# Patient Record
Sex: MALE | Race: WHITE
[De-identification: names, ages, dates, MRNs, and addresses within clinical notes are randomized per-mention and may not be internally consistent; named-entity substitution may affect disease eponyms.]

---

## 2019-01-28 ENCOUNTER — HOSPITAL ENCOUNTER (EMERGENCY)
Dept: HOSPITAL 26 - MED | Age: 29
LOS: 1 days | Discharge: HOME | End: 2019-01-29
Payer: MEDICAID

## 2019-01-28 VITALS — BODY MASS INDEX: 30.48 KG/M2 | WEIGHT: 225 LBS | HEIGHT: 72 IN

## 2019-01-28 VITALS — SYSTOLIC BLOOD PRESSURE: 135 MMHG | DIASTOLIC BLOOD PRESSURE: 81 MMHG

## 2019-01-28 DIAGNOSIS — E03.9: Primary | ICD-10-CM

## 2019-01-29 VITALS — DIASTOLIC BLOOD PRESSURE: 81 MMHG | SYSTOLIC BLOOD PRESSURE: 135 MMHG

## 2019-01-29 LAB
ALBUMIN FLD-MCNC: 4.1 G/DL (ref 3.4–5)
ANION GAP SERPL CALCULATED.3IONS-SCNC: 10.3 MMOL/L (ref 8–16)
AST SERPL-CCNC: 20 U/L (ref 15–37)
BASOPHILS # BLD AUTO: 0.1 K/UL (ref 0–0.22)
BASOPHILS NFR BLD AUTO: 0.7 % (ref 0–2)
BILIRUB SERPL-MCNC: 0.6 MG/DL (ref 0–1)
BUN SERPL-MCNC: 18 MG/DL (ref 7–18)
CHLORIDE SERPL-SCNC: 103 MMOL/L (ref 98–107)
CO2 SERPL-SCNC: 31.4 MMOL/L (ref 21–32)
CREAT SERPL-MCNC: 1 MG/DL (ref 0.7–1.3)
EOSINOPHIL # BLD AUTO: 0.5 K/UL (ref 0–0.4)
EOSINOPHIL NFR BLD AUTO: 5.4 % (ref 0–4)
ERYTHROCYTE [DISTWIDTH] IN BLOOD BY AUTOMATED COUNT: 12.8 % (ref 11.6–13.7)
GFR SERPL CREATININE-BSD FRML MDRD: 116 ML/MIN (ref 90–?)
GLUCOSE SERPL-MCNC: 105 MG/DL (ref 74–106)
HCT VFR BLD AUTO: 47.6 % (ref 36–52)
HGB BLD-MCNC: 15.9 G/DL (ref 12–18)
LYMPHOCYTES # BLD AUTO: 2.9 K/UL (ref 2–11.5)
LYMPHOCYTES NFR BLD AUTO: 34.2 % (ref 20.5–51.1)
MCH RBC QN AUTO: 28 PG (ref 27–31)
MCHC RBC AUTO-ENTMCNC: 33 G/DL (ref 33–37)
MCV RBC AUTO: 85 FL (ref 80–94)
MONOCYTES # BLD AUTO: 0.7 K/UL (ref 0.8–1)
MONOCYTES NFR BLD AUTO: 8.3 % (ref 1.7–9.3)
NEUTROPHILS # BLD AUTO: 4.4 K/UL (ref 1.8–7.7)
NEUTROPHILS NFR BLD AUTO: 51.4 % (ref 42.2–75.2)
PLATELET # BLD AUTO: 293 K/UL (ref 140–450)
POTASSIUM SERPL-SCNC: 3.7 MMOL/L (ref 3.5–5.1)
RBC # BLD AUTO: 5.6 MIL/UL (ref 4.2–6.1)
SODIUM SERPL-SCNC: 141 MMOL/L (ref 136–145)
TSH SERPL DL<=0.05 MIU/L-ACNC: 4.93 UIU/ML (ref 0.34–3.74)
WBC # BLD AUTO: 8.6 K/UL (ref 4.8–10.8)

## 2019-01-29 NOTE — NUR
PT REFUSED IV MANAGMENT AND NS BOLUS. PT STATED HE JUST WANTED LABS DRAWN AND 
DOES NOT WANT TO GET POKED AGAIN. ER MD MADE AWARE.

## 2019-01-29 NOTE — NUR
28/M PRESENTS TO ED, C/O "NOT FEELING WELL" AND "FEELING TIRED" AND NAUSEA X2 
WEEKS. PT DENIES CP, SOB, VOMITING. PT REPORTS INTERMITTMENT MILD L SIDED NECK 
PAIN. PT AOX4, GCS 15, RR EVEN AND UNLABORED

HX THYROID PROBLEMS

NO RX.